# Patient Record
Sex: MALE
[De-identification: names, ages, dates, MRNs, and addresses within clinical notes are randomized per-mention and may not be internally consistent; named-entity substitution may affect disease eponyms.]

---

## 2022-11-17 ENCOUNTER — APPOINTMENT (OUTPATIENT)
Dept: ORTHOPEDIC SURGERY | Facility: CLINIC | Age: 42
End: 2022-11-17
Payer: COMMERCIAL

## 2022-11-17 VITALS — WEIGHT: 180 LBS | HEIGHT: 66 IN | BODY MASS INDEX: 28.93 KG/M2

## 2022-11-17 DIAGNOSIS — M24.572 CONTRACTURE, LEFT ANKLE: ICD-10-CM

## 2022-11-17 DIAGNOSIS — E78.00 PURE HYPERCHOLESTEROLEMIA, UNSPECIFIED: ICD-10-CM

## 2022-11-17 DIAGNOSIS — E11.9 TYPE 2 DIABETES MELLITUS W/OUT COMPLICATIONS: ICD-10-CM

## 2022-11-17 DIAGNOSIS — M21.542 ACQUIRED CLUBFOOT, LEFT FOOT: ICD-10-CM

## 2022-11-17 PROCEDURE — 99203 OFFICE O/P NEW LOW 30 MIN: CPT

## 2022-11-17 PROCEDURE — 99213 OFFICE O/P EST LOW 20 MIN: CPT

## 2022-11-17 PROCEDURE — 73630 X-RAY EXAM OF FOOT: CPT | Mod: LT

## 2022-11-17 PROCEDURE — 73600 X-RAY EXAM OF ANKLE: CPT | Mod: LT

## 2022-11-17 NOTE — PHYSICAL EXAM
[Left] : left foot and ankle [2___] : dorsiflexion 2[unfilled]/5 [2+] : dorsalis pedis pulse: 2+ [] : no calf tenderness [FreeTextEntry3] : LLE atrophy  [de-identified] : plantar flexion 30 degrees [TWNoteComboBox7] : dorsiflexion -10 degrees

## 2022-11-17 NOTE — DISCUSSION/SUMMARY
[de-identified] : discussed the nature of the injury\par discussed custom brace for the left ankle\par WBAT. hep\par f/u 8 weeks \par \par [facility] CLINICAL NOTE FOR AFO / KAFO\par \par The patient requires an AFO for stabilization at the ankle and has the potential to benefit functionally and to return to ADL's safely.\par \par The patient cannot be fitted with a prefabricated brace due to the ankle deformities and the shape of the current limb.\par \par The patient requires stabilization in multiple planes due to the deformities present.\par \par The condition necessitating the orthosis is expected to be of longstanding duration. Lining indicated due to at-risk fragile skin.\par \par This custom molded AFO will allow the patient to return to the level of independence they are accustomed to and be custom fitted for total contact.\par \par Referred to [facility] for AFO fabrication\par

## 2022-11-17 NOTE — HISTORY OF PRESENT ILLNESS
[Left Leg] : left leg [Gradual] : gradual [7] : 7 [0] : 0 [Sharp] : sharp [Occasional] : occasional [Walking/activity] : walking/activity [de-identified] : 11/17/22: This is Mr. TIFFANIE ASHLEY  a 42 year old male who comes in today complaining of left foot/ankle pain for one month with no injury.  He reports an history of "mild polio." There is an altered gait and a deformity.  WBAT in sneakers. No treatments/surgeries [] : no

## 2022-11-17 NOTE — IMAGING
[Left] : left foot [Weight -] : weightbearing [There are no fractures, subluxations or dislocations. No significant abnormalities are seen] : There are no fractures, subluxations or dislocations. No significant abnormalities are seen [FreeTextEntry9] : equinovarus, no significant arthritis noted

## 2022-12-01 ENCOUNTER — APPOINTMENT (OUTPATIENT)
Dept: ORTHOPEDIC SURGERY | Facility: CLINIC | Age: 42
End: 2022-12-01

## 2023-02-02 ENCOUNTER — APPOINTMENT (OUTPATIENT)
Dept: ORTHOPEDIC SURGERY | Facility: CLINIC | Age: 43
End: 2023-02-02

## 2023-07-06 ENCOUNTER — APPOINTMENT (OUTPATIENT)
Dept: ORTHOPEDIC SURGERY | Facility: CLINIC | Age: 43
End: 2023-07-06

## 2023-07-10 ENCOUNTER — APPOINTMENT (OUTPATIENT)
Dept: ORTHOPEDIC SURGERY | Facility: CLINIC | Age: 43
End: 2023-07-10

## 2023-07-13 ENCOUNTER — APPOINTMENT (OUTPATIENT)
Dept: ORTHOPEDIC SURGERY | Facility: CLINIC | Age: 43
End: 2023-07-13